# Patient Record
Sex: MALE | Race: WHITE
[De-identification: names, ages, dates, MRNs, and addresses within clinical notes are randomized per-mention and may not be internally consistent; named-entity substitution may affect disease eponyms.]

---

## 2020-11-26 ENCOUNTER — HOSPITAL ENCOUNTER (EMERGENCY)
Dept: HOSPITAL 38 - CC.ED | Age: 75
Discharge: HOME | End: 2020-11-26
Payer: MEDICARE

## 2020-11-26 VITALS — DIASTOLIC BLOOD PRESSURE: 86 MMHG | HEART RATE: 99 BPM | SYSTOLIC BLOOD PRESSURE: 152 MMHG

## 2020-11-26 DIAGNOSIS — Z79.899: ICD-10-CM

## 2020-11-26 DIAGNOSIS — Z20.828: ICD-10-CM

## 2020-11-26 DIAGNOSIS — E11.9: ICD-10-CM

## 2020-11-26 DIAGNOSIS — I10: ICD-10-CM

## 2020-11-26 DIAGNOSIS — R55: Primary | ICD-10-CM

## 2020-11-26 LAB
CHLORIDE SERPL-SCNC: 99 MEQ/L (ref 98–106)
SODIUM SERPL-SCNC: 137 MEQ/L (ref 136–145)

## 2020-11-26 PROCEDURE — U0002 COVID-19 LAB TEST NON-CDC: HCPCS

## 2020-11-26 NOTE — EDM.PDOC
ED HPI GENERAL MEDICAL PROBLEM





- General


Chief Complaint: Syncope


Stated Complaint: dizzy


Time Seen by Provider: 11/26/20 11:25


Source of Information: Reports: Patient


History Limitations: Reports: No Limitations





- History of Present Illness


INITIAL COMMENTS - FREE TEXT/NARRATIVE: 





Lucas is a 75 year old male who presents to ER with complaints of feeling 

lightheaded off an on since Tuesday.  Admits that Monday evening, had a few 

alcoholic beverages and "a high sodium meal" and awoke on Tuesday feeling 

lightheaded.  Thought possibly related to high sodium intake so has been wa

tching his salt intake.  This am, came over to see his mom at [employer] and felt 

lightheaded when getting in his car and getting up from seeing her.  He feels 

fine when at rest and with lying down.  He has been monitoring his blood 

pressure as of late, systolic has been between 106-150.  Had a visit with Dr. Bonilla a month ago, states tests were all stable at that time.  





Does have a history of diabetes.  Does not check his blood sugars at home.  Last

A1c was 6.1 in August.


Onset: Today


Duration: Minutes:, Intermittent


Location: Reports: Head


Severity: Mild


Improves with: Reports: Rest


Worsens with: Reports: Movement


Associated Symptoms: Denies: Confusion, Chest Pain, Cough, Fever/Chills, Loss of

Appetite, Malaise, Nausea/Vomiting, Shortness of Breath, Weakness





- Related Data


                                    Allergies











Allergy/AdvReac Type Severity Reaction Status Date / Time


 


No Known Allergies Allergy   Verified 11/26/20 11:28











Home Meds: 


                                    Home Meds





Flaxseed/Omega3,6,9/Fatty Acid [Flax Seed Oil 1,300 mg Softgel] 1 cap PO DAILY 

02/16/16 [History]


Losartan/Hydrochlorothiazide [Losartan-HCTZ 100-25 MG] 1 tab PO DAILY 02/16/16 

[History]


Potassium Chloride [Klor-Con 10] 1 tab PO DAILY 02/16/16 [History]


amLODIPine [Norvasc] 10 mg PO BEDTIME 11/26/20 [History]











Past Medical History


Cardiovascular History: Reports: Hypertension


Endocrine/Metabolic History: Reports: Diabetes, Type II





Social & Family History





- Tobacco Use


Tobacco Use Status *Q: Never Tobacco User





- Caffeine Use


Caffeine Use: Reports: None





- Recreational Drug Use


Recreational Drug Use: No





ED ROS GENERAL





- Review of Systems


Review Of Systems: See Below


Constitutional: Reports: Malaise, Weakness.  Denies: Fever, Chills, Fatigue, 

Decreased Appetite


HEENT: Denies: Ear Pain, Rhinitis, Sinus Problem, Throat Pain, Vertigo


Respiratory: Denies: Shortness of Breath, Cough


Cardiovascular: Reports: Lightheadedness.  Denies: Chest Pain, Edema


Endocrine: Reports: Fatigue


GI/Abdominal: Denies: Abdominal Pain, Constipation, Diarrhea, Nausea, Vomiting


: Reports: No Symptoms


Musculoskeletal: Reports: No Symptoms


Skin: Reports: No Symptoms


Neurological: Reports: Syncope, Weakness





ED EXAM, DIZZINESS





- Physical Exam


Exam: See Below


Exam Limited By: No Limitations


General Appearance: Alert, WD/WN, No Apparent Distress


Eye Exam: Bilateral Eye: EOMI, PERRL


Ears: Normal External Exam, Normal TMs


Nose: Normal Inspection, Normal Mucosa, No Blood


Throat/Mouth: Normal Inspection, Normal Oropharynx


Head Exam: Normocephalic


Vertigo: No: reproducible


Neck: Normal Inspection, Supple, Non-Tender, Full Range of Motion


Respiratory/Chest: No Respiratory Distress, Lungs Clear, Normal Breath Sounds


Cardiovascular: Regular Rate, Rhythm


GI/Abdominal: Normal Bowel Sounds, Soft, Non-Tender


Neurological: Alert, Normal Mood/Affect, CN II-XII Intact, Oriented x 3


Extremities: Normal Inspection, No Pedal Edema


Skin Exam: Warm, Dry





Course





- Vital Signs


Last Recorded V/S: 


                                Last Vital Signs











Temp  97.8 F   11/26/20 11:26


 


Pulse  99   11/26/20 11:26


 


Resp  18   11/26/20 11:26


 


BP  152/86 H  11/26/20 11:26


 


Pulse Ox  99   11/26/20 11:26














- Orders/Labs/Meds


Orders: 


                               Active Orders 24 hr











 Category Date Time Status


 


 Chest 2V [CR] Stat Exams  11/26/20 12:03 Taken


 


 Head wo Cont [CT] Stat Exams  11/26/20 12:03 Taken


 


 Sodium Chloride 0.45% 1,000 ml Med  11/26/20 13:00 Active





 IV ASDIRECTED   








                                Medication Orders





Sodium Chloride (Sodium Chloride 0.45%)  1,000 mls @ 250 mls/hr IV ASDIRECTED 

RHETT


   Last Admin: 11/26/20 13:08  Dose: 250 mls/hr


   Documented by: BOBBY








Labs: 


                                Laboratory Tests











  11/26/20 11/26/20 11/26/20 Range/Units





  11:06 12:10 12:10 


 


WBC   14.6 H   (5.0-10.0)  10^3/uL


 


RBC   5.18   (4.50-6.00)  10^6/uL


 


Hgb   15.8   (14.0-18.0)  g/dL


 


Hct   47.0   (40.0-54.0)  %


 


MCV   90.7   (82.0-94.0)  fL


 


MCH   30.5   (27.0-32.0)  pg


 


MCHC   33.6   (33.0-38.0)  g/dL


 


RDW Coeff of Mohini   13.3   (11.0-15.0)  %


 


Plt Count   319   (150-400)  10^3/uL


 


Add Manual Diff   Yes   


 


Neutrophils % (Manual)   40   (35-85)  %


 


Band Neutrophils %   2   (0-5)  %


 


Lymphocytes % (Manual)   42   (21-55)  %


 


Monocytes % (Manual)   9   (2-12)  %


 


Absolute Neutrophils   6.13   (1.80-7.00)  10^3/uL


 


Lymphocytes # (Manual)   7.15 H   (1.00-4.80)  10^3/uL


 


Monocytes # (Manual)   1.31 H   (0.00-0.80)  10^3/uL


 


D-Dimer, Quantitative     (0.00-0.50)  


 


Sodium    137  (136-145)  mEq/L


 


Potassium    3.8  (3.5-5.0)  mEq/L


 


Chloride    99  ()  mEq/L


 


Carbon Dioxide    32  (21-32)  mmol/L


 


BUN    20 H  (7-18)  mg/dL


 


Creatinine    1.6 H  (0.7-1.3)  mg/dL


 


Est Cr Clr Drug Dosing    38.00  mL/min


 


Estimated GFR (MDRD)    42 L  (>=60)  mL/min


 


Glucose    225 H  (75-99)  mg/dL


 


Calcium    9.6  (8.4-10.1)  mg/dL


 


Total Bilirubin    1.2 H  (0.0-1.0)  mg/dL


 


AST    48 H  (15-37)  U/L


 


ALT    59  (12-78)  U/L


 


Alkaline Phosphatase    78  ()  U/L


 


Troponin I    < 0.017  (0.00-0.06)  ng/mL


 


C-Reactive Protein    0.2  (0.2-0.8)  mg/dL


 


NT-Pro-B Natriuret Pep    50  (0-1000)  pg/mL


 


Total Protein    7.4  (6.4-8.2)  g/dL


 


Albumin    4.0  (3.4-5.0)  g/dL


 


Urine Color     (YELLOW)  


 


Urine Appearance     (CLEAR)  


 


Urine pH     (4.5-8.0)  


 


Ur Specific Gravity     (1.003-1.020)  


 


Urine Protein     (NEGATIVE)  mg/dL


 


Urine Glucose (UA)     (NEGATIVE)  mg/dL


 


Urine Ketones     (NEGATIVE)  mg/dL


 


Urine Occult Blood     (NEGATIVE)  


 


Urine Nitrite     (NEGATIVE)  


 


Urine Bilirubin     (NEGATIVE)  


 


Urine Urobilinogen     (0.2-1.0)  EU/dL


 


Ur Leukocyte Esterase     (NEGATIVE)  


 


SARS CoV-2 RNA Rapid AMADEO  Negative    (NEGATIVE)  














  11/26/20 11/26/20 Range/Units





  12:10 12:10 


 


WBC    (5.0-10.0)  10^3/uL


 


RBC    (4.50-6.00)  10^6/uL


 


Hgb    (14.0-18.0)  g/dL


 


Hct    (40.0-54.0)  %


 


MCV    (82.0-94.0)  fL


 


MCH    (27.0-32.0)  pg


 


MCHC    (33.0-38.0)  g/dL


 


RDW Coeff of Mohini    (11.0-15.0)  %


 


Plt Count    (150-400)  10^3/uL


 


Add Manual Diff    


 


Neutrophils % (Manual)    (35-85)  %


 


Band Neutrophils %    (0-5)  %


 


Lymphocytes % (Manual)    (21-55)  %


 


Monocytes % (Manual)    (2-12)  %


 


Absolute Neutrophils    (1.80-7.00)  10^3/uL


 


Lymphocytes # (Manual)    (1.00-4.80)  10^3/uL


 


Monocytes # (Manual)    (0.00-0.80)  10^3/uL


 


D-Dimer, Quantitative   0.19  (0.00-0.50)  


 


Sodium    (136-145)  mEq/L


 


Potassium    (3.5-5.0)  mEq/L


 


Chloride    ()  mEq/L


 


Carbon Dioxide    (21-32)  mmol/L


 


BUN    (7-18)  mg/dL


 


Creatinine    (0.7-1.3)  mg/dL


 


Est Cr Clr Drug Dosing    mL/min


 


Estimated GFR (MDRD)    (>=60)  mL/min


 


Glucose    (75-99)  mg/dL


 


Calcium    (8.4-10.1)  mg/dL


 


Total Bilirubin    (0.0-1.0)  mg/dL


 


AST    (15-37)  U/L


 


ALT    (12-78)  U/L


 


Alkaline Phosphatase    ()  U/L


 


Troponin I    (0.00-0.06)  ng/mL


 


C-Reactive Protein    (0.2-0.8)  mg/dL


 


NT-Pro-B Natriuret Pep    (0-1000)  pg/mL


 


Total Protein    (6.4-8.2)  g/dL


 


Albumin    (3.4-5.0)  g/dL


 


Urine Color  Yellow   (YELLOW)  


 


Urine Appearance  Clear   (CLEAR)  


 


Urine pH  7.0   (4.5-8.0)  


 


Ur Specific Gravity  1.020   (1.003-1.020)  


 


Urine Protein  Negative   (NEGATIVE)  mg/dL


 


Urine Glucose (UA)  Negative   (NEGATIVE)  mg/dL


 


Urine Ketones  Negative   (NEGATIVE)  mg/dL


 


Urine Occult Blood  Negative   (NEGATIVE)  


 


Urine Nitrite  Negative   (NEGATIVE)  


 


Urine Bilirubin  Negative   (NEGATIVE)  


 


Urine Urobilinogen  0.2   (0.2-1.0)  EU/dL


 


Ur Leukocyte Esterase  Negative   (NEGATIVE)  


 


SARS CoV-2 RNA Rapid AMADEO    (NEGATIVE)  











Meds: 


Medications











Generic Name Dose Route Start Last Admin





  Trade Name Freq  PRN Reason Stop Dose Admin


 


Sodium Chloride  1,000 mls @ 250 mls/hr  11/26/20 13:00  11/26/20 13:08





  Sodium Chloride 0.45%  IV   250 mls/hr





  ASDIRECTED [facility]   [facility]ration














- Re-Assessments/Exams


Free Text/Narrative Re-Assessment/Exam: 





11/26/20 13:01


Labs note stable WBC due to history of leukocytosis.  CRP is negative.  

BUN/creatinine elevated in comparison to past history.  Blood pressure did drop 

20 points with standing.  Will given IV fluids over the next few hours.  Await 

CT scan of head.  EKG normal.  Patient aware and agrees with plan.


11/26/20 16:01


Patient is feeling better.  Has been up and ambulating and doing well.  Had a 

dinner meal without nausea.  Discussed keeping his appointment with Dr. Bonilla to 

follow up with possibly setting up event monitor, echo and carotid ultrasound if

 needed.





Departure





- Departure


Time of Disposition: 16:03


Disposition: Home, Self-Care 01


Clinical Impression: 


 Near syncope








- Discharge Information


*PRESCRIPTION DRUG MONITORING PROGRAM REVIEWED*: No


*COPY OF PRESCRIPTION DRUG MONITORING REPORT IN PATIENT MADDI: No


Instructions:  Near-Syncope, Easy-to-Read


Referrals: 


Karan Bonilla MD [Primary Care Provider] - 


Forms:  ED Department Discharge


Additional Instructions: 


1.  Push fluids


2.  Rest


3.  Follow up with Dr. Bonilla as planned as may need to consider carotid 

ultrasound, echocardiogram or event monitor due to near syncope


4.  Continue with current meds


5.  Call with any questions or concerns.





Sepsis Event Note (ED)





- Evaluation


Sepsis Screening Result: No Definite Risk





- Focused Exam


Vital Signs: 


                                   Vital Signs











  Temp Pulse Resp BP Pulse Ox


 


 11/26/20 11:26  97.8 F  99  18  152/86 H  99














- My Orders


Last 24 Hours: 


My Active Orders





11/26/20 12:03


Chest 2V [CR] Stat 


Head wo Cont [CT] Stat 





11/26/20 13:00


Sodium Chloride 0.45% 1,000 ml IV ASDIRECTED 














- Assessment/Plan


Last 24 Hours: 


My Active Orders





11/26/20 12:03


Chest 2V [CR] Stat 


Head wo Cont [CT] Stat 





11/26/20 13:00


Sodium Chloride 0.45% 1,000 ml IV ASDIRECTED